# Patient Record
Sex: MALE | Race: WHITE | URBAN - METROPOLITAN AREA
[De-identification: names, ages, dates, MRNs, and addresses within clinical notes are randomized per-mention and may not be internally consistent; named-entity substitution may affect disease eponyms.]

---

## 2017-04-19 ENCOUNTER — THERAPY VISIT (OUTPATIENT)
Dept: PHYSICAL THERAPY | Facility: CLINIC | Age: 32
End: 2017-04-19
Payer: COMMERCIAL

## 2017-04-19 DIAGNOSIS — M25.521 RIGHT ELBOW PAIN: ICD-10-CM

## 2017-04-19 DIAGNOSIS — M25.511 RIGHT SHOULDER PAIN: Primary | ICD-10-CM

## 2017-04-19 PROCEDURE — 97110 THERAPEUTIC EXERCISES: CPT | Mod: GP | Performed by: PHYSICAL THERAPIST

## 2017-04-19 PROCEDURE — 97161 PT EVAL LOW COMPLEX 20 MIN: CPT | Mod: GP | Performed by: PHYSICAL THERAPIST

## 2017-04-19 NOTE — PROGRESS NOTES
Subjective:    Patient is a 31 year old male presenting with rehab right elbow hpi and rehab right shoulder hpi. The history is provided by the patient. No  was used.   John Hawkins is a 31 year old male with a right elbow (right shoulder) condition.  Occurance: taking out of garbage.  Condition occurred: at home.    March 2017.    Site of Pain: right lateral elbow.  Radiates to: mid forearm.  Pain is described as aching and is intermittent and reported as 6/10.  Associated symptoms:  Loss of strength. Pain is worse in the P.M..  Exacerbated by: making a fist, grasping heavy object. Relieved by: ibuprofin.  Since onset symptoms are unchanged.  Special testing: none.  Previous treatment: none.  Improvement with previous treatment: na.  General health as reported by patient is good.                            John Hawkins is a 31 year old male with a right shoulder condition.  Occurance: lifting heavy garbage bag.  Condition occurred: at home.    March 2017.    Site of Pain: antrolateral (r) shoulder.  Radiates to: none.  Pain is described as sharp and is intermittent and reported as 7/10.  Associated symptoms:  Loss of strength and loss of motion/stiffness. Worse during: night.  Symptoms are exacerbated by using arm overhead, using arm behind back, lifting and carrying Relieved by: ibuprofin.  Since onset symptoms are gradually worsening.  Special testing: none.  Previous treatment: none.  Improvement with previous treatment: na.  General health as reported by patient is good.                                              Objective:    System                   Shoulder Evaluation:  ROM:  AROM:    Flexion:  Left:  175    Right:   with pain   Extension: Left: wnlRight: wnl   Abduction:  Left: 175 with popping   Right:  130 with pain     Internal Rotation:  Left:  Wnl    Right:  Wnl  External Rotation:  Left:  Wnl    Right:  Wnl with pain      Elbow Flexion:  Left:  Wnl    Right:   Wnl  Elbow Extension:  Left:  Wnl   Right:  Wnl              Strength:    Flexion: Left:5/5   Pain:    Right: 4+/5      Pain:  +  Extension:  Left: 5/5    Pain:      Abduction:  Left: 5/5  Pain:    Right: 4-/5      Pain:+    Internal Rotation:  Left:5/5     Pain:    Right: 4+/5     Pain:  External Rotation:   Left:5/5     Pain:   Right:4/5      Pain:++        Elbow Flexion:  Left:5/5     Pain:    Right:5/5     Pain:  Elbow Extension:  Left:5/5     Pain:    Right:4+/5     Pain:  Stability Testing:  not assessed      Special Tests:      Right shoulder positive for the following special tests:Impingement  Right shoulder negative for the following special tests:Bursal    Mobility Tests:    Glenohumeral anterior left:  Normal  Glenohumeral anterior right:  Hypermobile  Glenohumeral posterior left:  Normal  Glenohumeral posterior right:  Normal  Glenohumeral inferior left:  Normal  Glenohumeral inferior right:  Normal    Acromioclavicular left:  Normal  Acromioclavicular right:  Normal    Scapulothoracic right:  Normal  Sternoclavicular left:  Normal  Sternoclavicular right:  Normal  Scapulohumeral rhythm right:  Hypermobile     ROM:  AROM:      Flexion Elbow:  Left:wnl   Right:wnl  Extension Elbow:  Left: wnl    Right: wnl  Flexion Wrist:  Left: wnl    Right: wnl  Extension Wrist:  Left: wnl    Right:  Wnl  Supination Elbow/Wrist:  Left: 85Right: 80  Pronation Elbow/Wrist:  Left: wnl    Right: wnl  Radial Deviation:  Left: wnl   Right: wnl  Ulnar Deviation:  Right: wnl    Left: wnl        Strength:    Flexion Elbow:  Left: 5/5 Pain:    Right: 5/5 Pain:  Extension Elbow: Left: 5/5 Pain:    Right: 5/5 Pain:  Flexion Wrist:  Left: 5/5 Pain:    Right:/5 Pain:+  Extension Wrist: Left: 5/5 Pain:  Right:/5 Pain:++  Supination Elbow/Wrist: Left: 5/5 Pain:    Right: 4+/5 Pain:  Pronation Elbow/Wrist: Left: 5/5 Pain:        Right: 4/5  Pain:++  Radial Deviation:  Left: 5/5 Pain:    Right: 4+/5 Pain:  Ulnar Deviation: Left: 5/5  Pain:    Right: 5/5 Pain::  Dominance: Right   Left: 115 #      Right: 80# with pain  Ligament Testing:not assessed        Special Testing:  not assessed      Palpation:  Palpation elbow/wrist: right lateral epicondyle, moderate right tight wrist extensors.      Mobility:    Proximal Radioulnar:  Left: hypomobile   Right:  Hypomobile                                    tenderness noted at infraspinatus origin, posterior right rotator cuff tightness    General     ROS    Assessment/Plan:      Patient is a 31 year old male with right side shoulder and right side elbow complaints.    Patient has the following significant findings with corresponding treatment plan.                Diagnosis 1:  RIght shoulder pain  Pain -  hot/cold therapy, manual therapy, self management, education, directional preference exercise and home program  Decreased ROM/flexibility - manual therapy, therapeutic exercise, therapeutic activity and home program  Decreased strength - therapeutic exercise, therapeutic activities and home program  Impaired muscle performance - neuro re-education and home program  Decreased function - therapeutic activities and home program  Diagnosis 2:  Right elbow pain   Pain -  hot/cold therapy, manual therapy, self management, education, directional preference exercise and home program  Decreased ROM/flexibility - manual therapy, therapeutic exercise, therapeutic activity and home program  Decreased strength - therapeutic exercise, therapeutic activities and home program  Impaired muscle performance - neuro re-education and home program  Decreased function - therapeutic activities and home program    Therapy Evaluation Codes:   1) History comprised of:   Personal factors that impact the plan of care:      None.    Comorbidity factors that impact the plan of care are:      None.     Medications impacting care: None.  2) Examination of Body Systems comprised of:   Body structures and functions that  impact the plan of care:      Elbow and Shoulder.   Activity limitations that impact the plan of care are:      Lifting and Working.  3) Clinical presentation characteristics are:   Stable/Uncomplicated.  4) Decision-Making    Low complexity using standardized patient assessment instrument and/or measureable assessment of functional outcome.  Cumulative Therapy Evaluation is: Low complexity.    Previous and current functional limitations:  (See Goal Flow Sheet for this information)    Short term and Long term goals: (See Goal Flow Sheet for this information)     Communication ability:  Patient appears to be able to clearly communicate and understand verbal and written communication and follow directions correctly.  Treatment Explanation - The following has been discussed with the patient:   RX ordered/plan of care  Anticipated outcomes  Possible risks and side effects  This patient would benefit from PT intervention to resume normal activities.   Rehab potential is excellent.    Frequency:  1 X week, once daily  Duration:  for 6 weeks  Discharge Plan:  Achieve all LTG.  Independent in home treatment program.  Reach maximal therapeutic benefit.    Please refer to the daily flowsheet for treatment today, total treatment time and time spent performing 1:1 timed codes.

## 2017-04-19 NOTE — MR AVS SNAPSHOT
"              After Visit Summary   2017    John Hawkins    MRN: [id number]           Patient Information     Date Of Birth          1985        Visit Information        Provider Department      2017 5:50 PM Jaz Howe PT [facility] [employer] [facility]        Today's Diagnoses     Right shoulder pain    -  1    Right elbow pain           Follow-ups after your visit        Who to contact     If you have questions or need follow up information about today's clinic visit or your schedule please contact [facility] directly at No information on file..  Normal or non-critical lab and imaging results will be communicated to you by [employer], letter or phone within 4 business days after the clinic has received the results. If you do not hear from us within 7 days, please contact the clinic through Help/Systemst or phone. If you have a critical or abnormal lab result, we will notify you by phone as soon as possible.  Submit refill requests through [employer] or call your pharmacy and they will forward the refill request to us. Please allow 3 business days for your refill to be completed.          Additional Information About Your Visit        MyChart Information     [employer] lets you send messages to your doctor, view your test results, renew your prescriptions, schedule appointments and more. To sign up, go to [email or web address] . Click on \"Log in\" on the left side of the screen, which will take you to the Welcome page. Then click on \"Sign up Now\" on the right side of the page.     You will be asked to enter the access code listed below, as well as some personal information. Please follow the directions to create your username and password.     Your access code is: [email or web address]  Expires: 2017  9:19 AM     Your access code will  in 90 days. If you need help or a new code, please call your [place] clinic or [phone].        Care EveryWhere ID  "    This is your Care EveryWhere ID. This could be used by other organizations to access your [facility] medical records  [id number]         Blood Pressure from Last 3 Encounters:   01/13/15 (!) 131/91   01/27/12 122/80    Weight from Last 3 Encounters:   01/10/15 81.2 kg (179 lb)              We Performed the Following     ILEANA Inital Eval Report     ILEANA Inital Eval Report     PT Eval, Low Complexity (66874)     PT Eval, Low Complexity (82654)     Therapeutic Exercises        Primary Care Provider    Physician No Ref-Primary       No address on file        Thank you!     Thank you for choosing [facility] [employer] [place]  for your care. Our goal is always to provide you with excellent care. Hearing back from our patients is one way we can continue to improve our services. Please take a few minutes to complete the written survey that you may receive in the mail after your visit with us. Thank you!             Your Updated Medication List - Protect others around you: Learn how to safely use, store and throw away your medicines at www.disposemymeds.org.          This list is accurate as of: 4/19/17 11:59 PM.  Always use your most recent med list.                   Brand Name Dispense Instructions for use    hydrOXYzine 25 MG tablet    ATARAX    120 tablet    Take 1-2 tablets (25-50 mg) by mouth every 4 hours as needed for anxiety       nicotine 14 MG/24HR 24 hr patch    NICODERM CQ    21 patch    Place 1 patch onto the skin daily       sertraline 100 MG tablet    ZOLOFT    30 tablet    Take 1 tablet (100 mg) by mouth daily       traZODone 50 MG tablet    DESYREL    60 tablet    Take 1 tablet (50 mg) by mouth nightly as needed for sleep

## 2017-04-19 NOTE — LETTER
[facility] [place] [id number] [facility] [address]    2017    Re: John Hawkins   :   1985  MRN:  [id number]   REFERRING PHYSICIAN:   Sugey Barber    [facility]    Date of Initial Evaluation: 17  Visits:  Rxs Used: 1  Reason for Referral:     Right shoulder pain  Right elbow pain    EVALUATION SUMMARY    Subjective:   Patient is a 31 year old male presenting with rehab right elbow hpi and rehab right shoulder hpi. The history is provided by the patient. No  was used.   John Hawkins is a 31 year old male with a right elbow (right shoulder) condition.  Occurance: taking out of garbage.  Condition occurred: at home.    2017.    Site of Pain: right lateral elbow.  Radiates to: mid forearm.  Pain is described as aching and is intermittent and reported as 6/10.  Associated symptoms:  Loss of strength. Pain is worse in the P.M..  Exacerbated by: making a fist, grasping heavy object. Relieved by: ibuprofin.  Since onset symptoms are unchanged.  Special testing: none.  Previous treatment: none.  Improvement with previous treatment: na.  General health as reported by patient is good.                  John Hawkins is a 31 year old male with a right shoulder condition.  Occurance: lifting heavy garbage bag.  Condition occurred: at home.    2017.    Site of Pain: antrolateral (r) shoulder.  Radiates to: none.  Pain is described as sharp and is intermittent and reported as 7/10.  Associated symptoms:  Loss of strength and loss of motion/stiffness. Worse during: night.  Symptoms are exacerbated by using arm overhead, using arm behind back, lifting and carrying Relieved by: ibuprofin.  Since onset symptoms are gradually worsening.  Special testing: none.  Previous treatment: none.  Improvement with previous treatment: na.  General health as reported by patient is good.                   Pain level reported as 6/10.                General health as reported by patient is good.  Pertinent medical history includes:  Depression, chemical dependency, overweight and asthma.    Surgical history: Meckels Diverticulum.  Current medications:  Anti-depressants.  Current occupation is Student.    Primary job tasks include:  Prolonged sitting and lifting.                              Objective:  System       Shoulder Evaluation:  ROM:  AROM:    Flexion:  Left:  175    Right:   with pain   Extension: Left: wnlRight: wnl   Abduction:  Left: 175 with popping   Right:  130 with pain   Internal Rotation:  Left:  Wnl    Right:  Wnl  External Rotation:  Left:  Wnl    Right:  Wnl with pain  Elbow Flexion:  Left:  Wnl    Right:  Wnl  Elbow Extension:  Left:  Wnl   Right:  Wnl    Strength:    Flexion: Left:5/5   Pain:    Right: 4+/5      Pain:  +  Extension:  Left: 5/5    Pain:      Abduction:  Left: 5/5  Pain:    Right: 4-/5      Pain:+  Internal Rotation:  Left:5/5     Pain:    Right: 4+/5     Pain:  External Rotation:   Left:5/5     Pain:   Right:4/5      Pain:++    Elbow Flexion:  Left:5/5     Pain:    Right:5/5     Pain:  Elbow Extension:  Left:5/5     Pain:    Right:4+/5     Pain:    Stability Testing:  not assessed  Special Tests:    Right shoulder positive for the following special tests:Impingement  Right shoulder negative for the following special tests:Bursal    Mobility Tests:    Glenohumeral anterior left:  Normal  Glenohumeral anterior right:  Hypermobile  Glenohumeral posterior left:  Normal  Glenohumeral posterior right:  Normal  Glenohumeral inferior left:  Normal  Glenohumeral inferior right:  Normal    Acromioclavicular left:  Normal  Acromioclavicular right:  Normal    Scapulothoracic right:  Normal  Sternoclavicular left:  Normal  Sternoclavicular right:  Normal  Scapulohumeral rhythm right:  Hypermobile     ROM:  AROM:    Flexion Elbow:  Left:wnl   Right:wnl  Extension Elbow:  Left: wnl     Right: wnl  Flexion Wrist:  Left: wnl    Right: wnl  Extension Wrist:  Left: wnl    Right:  Wnl  Supination Elbow/Wrist:  Left: 85Right: 80  Pronation Elbow/Wrist:  Left: wnl    Right: wnl  Radial Deviation:  Left: wnl   Right: wnl  Ulnar Deviation:  Right: wnl    Left: wnl        Strength:    Flexion Elbow:  Left: 5/5 Pain:    Right: 5/5 Pain:  Extension Elbow: Left: 5/5 Pain:    Right: 5/5 Pain:  Flexion Wrist:  Left: 5/5 Pain:    Right:/5 Pain:+  Extension Wrist: Left: 5/5 Pain:  Right:/5 Pain:++  Supination Elbow/Wrist: Left: 5/5 Pain:    Right: 4+/5 Pain:  Pronation Elbow/Wrist: Left: 5/5 Pain:        Right: 4/5  Pain:++  Radial Deviation:  Left: 5/5 Pain:    Right: 4+/5 Pain:  Ulnar Deviation: Left: 5/5 Pain:    Right: 5/5 Pain::  Dominance: Right   Left: 115 #      Right: 80# with pain    Ligament Testing:not assessed  Special Testing:  not assessed  Palpation:  Palpation elbow/wrist: right lateral epicondyle, moderate right tight wrist extensors.  Mobility:    Proximal Radioulnar:  Left: hypomobile   Right:  Hypomobile  tenderness noted at infraspinatus origin, posterior right rotator cuff tightness    General   ROS    Assessment/Plan:    Patient is a 31 year old male with right side shoulder and right side elbow complaints.    Patient has the following significant findings with corresponding treatment plan.                Diagnosis 1:  RIght shoulder pain  Pain -  hot/cold therapy, manual therapy, self management, education, directional preference exercise and home program  Decreased ROM/flexibility - manual therapy, therapeutic exercise, therapeutic activity and home program  Decreased strength - therapeutic exercise, therapeutic activities and home program  Impaired muscle performance - neuro re-education and home program  Decreased function - therapeutic activities and home program  Diagnosis 2:  Right elbow pain   Pain -  hot/cold therapy, manual therapy, self management, education,  directional preference exercise and home program  Decreased ROM/flexibility - manual therapy, therapeutic exercise, therapeutic activity and home program  Decreased strength - therapeutic exercise, therapeutic activities and home program  Impaired muscle performance - neuro re-education and home program  Decreased function - therapeutic activities and home program    Therapy Evaluation Codes:   1) History comprised of:   Personal factors that impact the plan of care:      None.    Comorbidity factors that impact the plan of care are:      None.     Medications impacting care: None.  2) Examination of Body Systems comprised of:   Body structures and functions that impact the plan of care:      Elbow and Shoulder.   Activity limitations that impact the plan of care are:      Lifting and Working.  3) Clinical presentation characteristics are:   Stable/Uncomplicated.  4) Decision-Making    Low complexity using standardized patient assessment instrument and/or measureable assessment of functional outcome.  Cumulative Therapy Evaluation is: Low complexity.    Previous and current functional limitations:  (See Goal Flow Sheet for this information)    Short term and Long term goals: (See Goal Flow Sheet for this information)     Communication ability:  Patient appears to be able to clearly communicate and understand verbal and written communication and follow directions correctly.  Treatment Explanation - The following has been discussed with the patient:   RX ordered/plan of care  Anticipated outcomes  Possible risks and side effects  This patient would benefit from PT intervention to resume normal activities.   Rehab potential is excellent.    Frequency:  1 X week, once daily  Duration:  for 6 weeks  Discharge Plan:  Achieve all LTG.  Independent in home treatment program.  Reach maximal therapeutic benefit.    Thank you for your referral.    INQUIRIES  Therapist: Jaz Howe, PT  [facility]  [place] [id number] [facility] [address]  Fax: [phone]

## 2017-05-10 ENCOUNTER — THERAPY VISIT (OUTPATIENT)
Dept: PHYSICAL THERAPY | Facility: CLINIC | Age: 32
End: 2017-05-10
Payer: COMMERCIAL

## 2017-05-10 DIAGNOSIS — M25.521 RIGHT ELBOW PAIN: Primary | ICD-10-CM

## 2017-05-10 PROCEDURE — 97110 THERAPEUTIC EXERCISES: CPT | Mod: GP | Performed by: PHYSICAL THERAPIST

## 2017-05-10 PROCEDURE — 97112 NEUROMUSCULAR REEDUCATION: CPT | Mod: GP | Performed by: PHYSICAL THERAPIST

## 2017-05-10 PROCEDURE — 97140 MANUAL THERAPY 1/> REGIONS: CPT | Mod: GP | Performed by: PHYSICAL THERAPIST

## 2017-05-23 ENCOUNTER — THERAPY VISIT (OUTPATIENT)
Dept: PHYSICAL THERAPY | Facility: CLINIC | Age: 32
End: 2017-05-23
Payer: COMMERCIAL

## 2017-05-23 DIAGNOSIS — M25.521 RIGHT ELBOW PAIN: ICD-10-CM

## 2017-05-23 DIAGNOSIS — M25.511 RIGHT SHOULDER PAIN: Primary | ICD-10-CM

## 2017-05-23 PROCEDURE — 97112 NEUROMUSCULAR REEDUCATION: CPT | Mod: GP | Performed by: PHYSICAL THERAPIST

## 2017-05-23 PROCEDURE — 97140 MANUAL THERAPY 1/> REGIONS: CPT | Mod: GP | Performed by: PHYSICAL THERAPIST

## 2017-06-05 ENCOUNTER — THERAPY VISIT (OUTPATIENT)
Dept: PHYSICAL THERAPY | Facility: CLINIC | Age: 32
End: 2017-06-05
Payer: COMMERCIAL

## 2017-06-05 DIAGNOSIS — M25.521 RIGHT ELBOW PAIN: ICD-10-CM

## 2017-06-05 DIAGNOSIS — M25.511 RIGHT SHOULDER PAIN: Primary | ICD-10-CM

## 2017-06-05 PROCEDURE — 97140 MANUAL THERAPY 1/> REGIONS: CPT | Mod: GP | Performed by: PHYSICAL THERAPIST

## 2017-06-05 PROCEDURE — 97112 NEUROMUSCULAR REEDUCATION: CPT | Mod: GP | Performed by: PHYSICAL THERAPIST

## 2021-04-07 ENCOUNTER — TRANSFERRED RECORDS (OUTPATIENT)
Dept: HEALTH INFORMATION MANAGEMENT | Facility: CLINIC | Age: 36
End: 2021-04-07

## 2021-04-08 ENCOUNTER — TRANSCRIBE ORDERS (OUTPATIENT)
Dept: OTHER | Age: 36
End: 2021-04-08

## 2021-04-08 ENCOUNTER — MEDICAL CORRESPONDENCE (OUTPATIENT)
Dept: HEALTH INFORMATION MANAGEMENT | Facility: CLINIC | Age: 36
End: 2021-04-08

## 2021-04-08 DIAGNOSIS — R11.2 INTRACTABLE VOMITING WITH NAUSEA, UNSPECIFIED VOMITING TYPE: ICD-10-CM

## 2021-04-08 DIAGNOSIS — R63.4 WEIGHT LOSS: Primary | ICD-10-CM

## 2022-08-10 DIAGNOSIS — R69 DIAGNOSIS UNKNOWN: Primary | ICD-10-CM

## 2022-08-25 ENCOUNTER — CARE COORDINATION (OUTPATIENT)
Dept: GASTROENTEROLOGY | Facility: CLINIC | Age: 37
End: 2022-08-25

## 2022-11-18 ENCOUNTER — LAB REQUISITION (OUTPATIENT)
Dept: LAB | Facility: CLINIC | Age: 37
End: 2022-11-18
Payer: COMMERCIAL

## 2022-11-18 DIAGNOSIS — F11.20 OPIOID DEPENDENCE, UNCOMPLICATED (H): ICD-10-CM

## 2022-11-18 PROCEDURE — 80053 COMPREHEN METABOLIC PANEL: CPT | Mod: ORL | Performed by: INTERNAL MEDICINE

## 2022-11-19 LAB
ALBUMIN SERPL BCG-MCNC: 4.5 G/DL (ref 3.5–5.2)
ALP SERPL-CCNC: 60 U/L (ref 40–129)
ALT SERPL W P-5'-P-CCNC: 11 U/L (ref 10–50)
ANION GAP SERPL CALCULATED.3IONS-SCNC: 16 MMOL/L (ref 7–15)
AST SERPL W P-5'-P-CCNC: 18 U/L (ref 10–50)
BILIRUB SERPL-MCNC: 0.5 MG/DL
BUN SERPL-MCNC: 11.4 MG/DL (ref 6–20)
CALCIUM SERPL-MCNC: 9.3 MG/DL (ref 8.6–10)
CHLORIDE SERPL-SCNC: 100 MMOL/L (ref 98–107)
CREAT SERPL-MCNC: 0.56 MG/DL (ref 0.67–1.17)
DEPRECATED HCO3 PLAS-SCNC: 20 MMOL/L (ref 22–29)
GFR SERPL CREATININE-BSD FRML MDRD: >90 ML/MIN/1.73M2
GLUCOSE SERPL-MCNC: 81 MG/DL (ref 70–99)
POTASSIUM SERPL-SCNC: 3.9 MMOL/L (ref 3.4–5.3)
PROT SERPL-MCNC: 7.2 G/DL (ref 6.4–8.3)
SODIUM SERPL-SCNC: 136 MMOL/L (ref 136–145)

## 2024-07-12 ENCOUNTER — LAB REQUISITION (OUTPATIENT)
Dept: LAB | Facility: CLINIC | Age: 39
End: 2024-07-12
Payer: COMMERCIAL

## 2024-07-12 DIAGNOSIS — Z00.00 ENCOUNTER FOR GENERAL ADULT MEDICAL EXAMINATION WITHOUT ABNORMAL FINDINGS: ICD-10-CM

## 2024-07-12 LAB
ALBUMIN SERPL BCG-MCNC: 4.6 G/DL (ref 3.5–5.2)
ALP SERPL-CCNC: 58 U/L (ref 40–150)
ALT SERPL W P-5'-P-CCNC: 14 U/L (ref 0–70)
ANION GAP SERPL CALCULATED.3IONS-SCNC: 11 MMOL/L (ref 7–15)
AST SERPL W P-5'-P-CCNC: 19 U/L (ref 0–45)
BILIRUB SERPL-MCNC: 0.6 MG/DL
BUN SERPL-MCNC: 14.5 MG/DL (ref 6–20)
CALCIUM SERPL-MCNC: 9.1 MG/DL (ref 8.6–10)
CHLORIDE SERPL-SCNC: 103 MMOL/L (ref 98–107)
CHOLEST SERPL-MCNC: 173 MG/DL
CREAT SERPL-MCNC: 0.72 MG/DL (ref 0.67–1.17)
DEPRECATED HCO3 PLAS-SCNC: 24 MMOL/L (ref 22–29)
EGFRCR SERPLBLD CKD-EPI 2021: >90 ML/MIN/1.73M2
FASTING STATUS PATIENT QL REPORTED: NO
FASTING STATUS PATIENT QL REPORTED: NO
GLUCOSE SERPL-MCNC: 110 MG/DL (ref 70–99)
HCV AB SERPL QL IA: NONREACTIVE
HDLC SERPL-MCNC: 62 MG/DL
HIV 1+2 AB+HIV1 P24 AG SERPL QL IA: NONREACTIVE
LDLC SERPL CALC-MCNC: 104 MG/DL
NONHDLC SERPL-MCNC: 111 MG/DL
POTASSIUM SERPL-SCNC: 4.1 MMOL/L (ref 3.4–5.3)
PROT SERPL-MCNC: 7.2 G/DL (ref 6.4–8.3)
SODIUM SERPL-SCNC: 138 MMOL/L (ref 135–145)
TRIGL SERPL-MCNC: 37 MG/DL

## 2024-07-12 PROCEDURE — 80061 LIPID PANEL: CPT | Mod: ORL | Performed by: INTERNAL MEDICINE

## 2024-07-12 PROCEDURE — 86803 HEPATITIS C AB TEST: CPT | Mod: ORL | Performed by: INTERNAL MEDICINE

## 2024-07-12 PROCEDURE — 87389 HIV-1 AG W/HIV-1&-2 AB AG IA: CPT | Mod: ORL | Performed by: INTERNAL MEDICINE

## 2024-07-12 PROCEDURE — 80053 COMPREHEN METABOLIC PANEL: CPT | Mod: ORL | Performed by: INTERNAL MEDICINE

## 2025-05-28 ENCOUNTER — TRANSFERRED RECORDS (OUTPATIENT)
Dept: ADMINISTRATIVE | Facility: CLINIC | Age: 40
End: 2025-05-28
Payer: COMMERCIAL